# Patient Record
Sex: FEMALE | Race: OTHER | HISPANIC OR LATINO | RURAL
[De-identification: names, ages, dates, MRNs, and addresses within clinical notes are randomized per-mention and may not be internally consistent; named-entity substitution may affect disease eponyms.]

---

## 2024-11-05 DIAGNOSIS — R79.89 ELEVATED LIVER FUNCTION TESTS: Primary | ICD-10-CM

## 2024-12-30 ENCOUNTER — OFFICE VISIT (OUTPATIENT)
Dept: GASTROENTEROLOGY | Facility: CLINIC | Age: 54
End: 2024-12-30
Payer: COMMERCIAL

## 2024-12-30 VITALS
WEIGHT: 208.19 LBS | OXYGEN SATURATION: 100 % | BODY MASS INDEX: 36.89 KG/M2 | HEIGHT: 63 IN | SYSTOLIC BLOOD PRESSURE: 154 MMHG | HEART RATE: 62 BPM | DIASTOLIC BLOOD PRESSURE: 81 MMHG

## 2024-12-30 DIAGNOSIS — R79.89 ELEVATED LIVER FUNCTION TESTS: ICD-10-CM

## 2024-12-30 PROCEDURE — 4010F ACE/ARB THERAPY RXD/TAKEN: CPT | Mod: CPTII,,, | Performed by: NURSE PRACTITIONER

## 2024-12-30 PROCEDURE — 99204 OFFICE O/P NEW MOD 45 MIN: CPT | Mod: PBBFAC | Performed by: NURSE PRACTITIONER

## 2024-12-30 PROCEDURE — 3008F BODY MASS INDEX DOCD: CPT | Mod: CPTII,,, | Performed by: NURSE PRACTITIONER

## 2024-12-30 PROCEDURE — 1159F MED LIST DOCD IN RCRD: CPT | Mod: CPTII,,, | Performed by: NURSE PRACTITIONER

## 2024-12-30 PROCEDURE — 85610 PROTHROMBIN TIME: CPT | Performed by: NURSE PRACTITIONER

## 2024-12-30 PROCEDURE — 99204 OFFICE O/P NEW MOD 45 MIN: CPT | Mod: S$PBB,,, | Performed by: NURSE PRACTITIONER

## 2024-12-30 PROCEDURE — 3079F DIAST BP 80-89 MM HG: CPT | Mod: CPTII,,, | Performed by: NURSE PRACTITIONER

## 2024-12-30 PROCEDURE — 3077F SYST BP >= 140 MM HG: CPT | Mod: CPTII,,, | Performed by: NURSE PRACTITIONER

## 2024-12-30 PROCEDURE — 99999 PR PBB SHADOW E&M-NEW PATIENT-LVL IV: CPT | Mod: PBBFAC,,, | Performed by: NURSE PRACTITIONER

## 2024-12-30 RX ORDER — LEVOTHYROXINE SODIUM 125 UG/1
1 TABLET ORAL DAILY
COMMUNITY
Start: 2024-10-29

## 2024-12-30 RX ORDER — LISINOPRIL 10 MG/1
1 TABLET ORAL DAILY
COMMUNITY
Start: 2024-10-29

## 2024-12-30 NOTE — PROGRESS NOTES
Hilario Sosa is a 54 y.o. female here for Elevated Hepatic Enzymes        PCP: No primary care provider on file.  Referring Provider: Sandra Price, Lorena-oskar  [address]  [facility] [place] [id number]     HPI:  Presents for referral due to elevated liver enzymes.  Patient reports that she was first diagnosed with elevated liver enzymes 3 or 4 years ago.  Review of record shows a liver ultrasound that was performed on 01/17/2020 that did show hepatomegaly and fatty liver.  Labs from 10/29/2024 from an outside facility reviewed, , , alkaline phos 78, bilirubin 0.2.  No anemia.  Patient reports that at the time of elevated liver enzymes that she had started taking turmeric for arthritic pain.  She has since discontinued turmeric.  No other over-the-counter supplements.  Reports that she only drinks alcohol a couple of times per year.  No known family history of liver disease.  Reports occasional diarrhea.  No previous colonoscopy.  States that she did have a negative Cologuard 3 or 4 years ago.  Results are not available for review. Reports difficulty with weight loss.          ROS:  Review of Systems   Constitutional:  Negative for appetite change, fatigue, fever and unexpected weight change.   HENT:  Negative for trouble swallowing. Voice change: occasional.   Gastrointestinal:  Positive for diarrhea. Negative for abdominal pain, blood in stool, change in bowel habit, constipation, nausea, vomiting and reflux.   Musculoskeletal:  Negative for gait problem.   Integumentary:  Negative for pallor.   Psychiatric/Behavioral:  The patient is not nervous/anxious.           PMHX:  has no past medical history on file.    PSHX:  has no past surgical history on file.    PFHX: family history is not on file.    PSlHX:  reports that she has never smoked. She has never been exposed to tobacco smoke. She has never used smokeless tobacco.        Review of patient's allergies  "indicates:  No Known Allergies    Medication List with Changes/Refills   Current Medications    LEVOTHYROXINE (SYNTHROID) 125 MCG TABLET    Take 1 tablet by mouth once daily.    LISINOPRIL 10 MG TABLET    Take 1 tablet by mouth once daily.        Objective Findings:  Vital Signs:  BP (!) 154/81   Pulse 62   Ht 5' 3" (1.6 m)   Wt 94.4 kg (208 lb 3.2 oz)   SpO2 100%   BMI 36.88 kg/m²  Body mass index is 36.88 kg/m².    Physical Exam:  Physical Exam  Vitals and nursing note reviewed.   Constitutional:       General: She is not in acute distress.     Appearance: Normal appearance.   HENT:      Mouth/Throat:      Mouth: Mucous membranes are moist.   Cardiovascular:      Rate and Rhythm: Normal rate.   Pulmonary:      Effort: Pulmonary effort is normal.      Breath sounds: No wheezing, rhonchi or rales.   Abdominal:      General: Bowel sounds are normal. There is no distension.      Palpations: Abdomen is soft. There is no mass.      Tenderness: There is no abdominal tenderness.   Skin:     General: Skin is warm and dry.      Coloration: Skin is not jaundiced or pale.   Neurological:      Mental Status: She is alert and oriented to person, place, and time.   Psychiatric:         Mood and Affect: Mood normal.          Labs:  No results found for: "WBC", "HGB", "HCT", "MCV", "RDW", "PLT", "GRAN", "LYMPH", "MONO", "EOS", "BASO"  No results found for: "NA", "K", "CL", "CO2", "GLU", "BUN", "CREATININE", "CALCIUM", "PROT", "ALBUMIN", "BILITOT", "ALKPHOS", "AST", "ALT"      Imaging: No results found.      Assessment:  Hilario Sosa is a 54 y.o. female here with:  1. Elevated liver function tests          Recommendations:  1. Liver ultrasound and elastography  2. Liver labs today  3. No over the counter herbal supplements  4. Exercise 150 minutes per week  Weight loss of 7-10%. Weight loss should be gradual  Diet low in saturated fats and carbohydrates  Good glucose and cholesterol control    Portions of this note " "may have been created with voice recognition software.  Occasional wrong word or "sound a like substitutions may have occurred due to inherent limitations of voice recognition software.  Please read the note carefully and recognize using contexts, where substitutions have occurred.    Diagnosis, risks, benefits, and side effects of any medications and treatment plan were discussed with the patient.  All questions were answered to the satisfaction of the patient, and patient verbalized understanding and agreement to the treatment plan.          Follow up in about 3 months (around 3/30/2025).      Order summary:  Orders Placed This Encounter    US Abdomen Limited    US Elastography Liver w/imaging    Ceruloplasmin    Hepatitis B Core Antibody, IgM    CL EIA w/ Reflex to dsDNA/JOSE    Alpha-1-Antitrypsin    Hepatitis C Antibody    Hepatitis B Surface Ab, Qualitative    Hepatitis B Surface Antigen    Ferritin    Iron and TIBC    Protein Electrophoresis, Serum with Reflex JANNETTE    CBC Auto Differential    Comprehensive Metabolic Panel    Protime-INR    Antimitochondrial Antibody    Anti-Smooth Muscle Antibody    IgG    IgM    IgA    Endomysial Antibodies, IgG       Thank you for allowing me to participate in the care of Hilario Sosa.      LUCIA ContrerasC          "

## 2024-12-31 ENCOUNTER — TELEPHONE (OUTPATIENT)
Dept: GASTROENTEROLOGY | Facility: CLINIC | Age: 54
End: 2024-12-31
Payer: COMMERCIAL

## 2024-12-31 NOTE — TELEPHONE ENCOUNTER
Results called to patient. Verbalized understanding.          ----- Message from JAYLEN Browning sent at 12/31/2024 12:47 PM CST -----  Hepatitis C negative. Hepatitis B surface AB is negative. Some labs are pending.

## 2025-03-27 ENCOUNTER — TELEPHONE (OUTPATIENT)
Dept: GASTROENTEROLOGY | Facility: CLINIC | Age: 55
End: 2025-03-27

## 2025-03-27 NOTE — TELEPHONE ENCOUNTER
Returned call to patient. Rescheduled US for 4/14/25 at 8:30 am per patient request. Verbalized understanding.          ----- Message from Tech Laturina sent at 3/26/2025 10:36 AM CDT -----  Who Called: Hilario Serrano is requesting a sooner appointment. Caller declined first available appointment listed below. Caller will not accept being placed on the waitlist and is requesting a message be sent to doctor.When is the first available appointment?N/AOptions offered (Virtual Visit, Urgent Care): Symptoms:Preferred Method of Contact: Phone CallPatient's Preferred Phone Number on File: [phone] Best Call Back Number, if different:Additional Information: patient said that she saw ALEAH Farrell on 12/30/24 and she needs to get the US ABD rescheduled, because she has a new insurance now.

## 2025-04-14 ENCOUNTER — TELEPHONE (OUTPATIENT)
Dept: GASTROENTEROLOGY | Facility: CLINIC | Age: 55
End: 2025-04-14
Payer: COMMERCIAL

## 2025-04-14 ENCOUNTER — HOSPITAL ENCOUNTER (OUTPATIENT)
Dept: RADIOLOGY | Facility: HOSPITAL | Age: 55
Discharge: HOME | End: 2025-04-14
Attending: NURSE PRACTITIONER
Payer: COMMERCIAL

## 2025-04-14 ENCOUNTER — RESULTS FOLLOW-UP (OUTPATIENT)
Dept: GASTROENTEROLOGY | Facility: CLINIC | Age: 55
End: 2025-04-14

## 2025-04-14 DIAGNOSIS — R79.89 ELEVATED LIVER FUNCTION TESTS: ICD-10-CM

## 2025-04-14 PROCEDURE — 76705 ECHO EXAM OF ABDOMEN: CPT | Mod: TC

## 2025-04-14 PROCEDURE — 76705 ECHO EXAM OF ABDOMEN: CPT | Mod: 26,,, | Performed by: RADIOLOGY

## 2025-04-14 PROCEDURE — 76981 USE PARENCHYMA: CPT | Mod: TC

## 2025-04-14 PROCEDURE — 76981 USE PARENCHYMA: CPT | Mod: 26,,, | Performed by: RADIOLOGY

## 2025-04-14 NOTE — TELEPHONE ENCOUNTER
Results and recommendations of US and elastography called to patient. Verbalized understanding.              ----- Message from JAYLEN Browning sent at 4/14/2025 12:08 PM CDT -----  No significant clinical risk of fibrosis.  Metavir score F0 to F1  ----- Message -----  From: Interface, Rad Results In  Sent: 4/14/2025  11:45 AM CDT  To: JAYLEN Freedman

## 2025-04-28 ENCOUNTER — TELEPHONE (OUTPATIENT)
Dept: GASTROENTEROLOGY | Facility: CLINIC | Age: 55
End: 2025-04-28
Payer: COMMERCIAL

## 2025-04-30 ENCOUNTER — OFFICE VISIT (OUTPATIENT)
Dept: GASTROENTEROLOGY | Facility: CLINIC | Age: 55
End: 2025-04-30
Payer: COMMERCIAL

## 2025-04-30 VITALS
HEART RATE: 68 BPM | SYSTOLIC BLOOD PRESSURE: 155 MMHG | BODY MASS INDEX: 35.73 KG/M2 | WEIGHT: 201.63 LBS | OXYGEN SATURATION: 100 % | DIASTOLIC BLOOD PRESSURE: 88 MMHG | HEIGHT: 63 IN

## 2025-04-30 DIAGNOSIS — R79.89 ELEVATED LIVER FUNCTION TESTS: Primary | ICD-10-CM

## 2025-04-30 DIAGNOSIS — K76.0 HEPATIC STEATOSIS: ICD-10-CM

## 2025-04-30 PROCEDURE — 3077F SYST BP >= 140 MM HG: CPT | Mod: CPTII,,, | Performed by: NURSE PRACTITIONER

## 2025-04-30 PROCEDURE — 99999 PR PBB SHADOW E&M-EST. PATIENT-LVL III: CPT | Mod: PBBFAC,,, | Performed by: NURSE PRACTITIONER

## 2025-04-30 PROCEDURE — 99214 OFFICE O/P EST MOD 30 MIN: CPT | Mod: S$PBB,,, | Performed by: NURSE PRACTITIONER

## 2025-04-30 PROCEDURE — 1159F MED LIST DOCD IN RCRD: CPT | Mod: CPTII,,, | Performed by: NURSE PRACTITIONER

## 2025-04-30 PROCEDURE — 3079F DIAST BP 80-89 MM HG: CPT | Mod: CPTII,,, | Performed by: NURSE PRACTITIONER

## 2025-04-30 PROCEDURE — 99213 OFFICE O/P EST LOW 20 MIN: CPT | Mod: PBBFAC | Performed by: NURSE PRACTITIONER

## 2025-04-30 PROCEDURE — 3008F BODY MASS INDEX DOCD: CPT | Mod: CPTII,,, | Performed by: NURSE PRACTITIONER

## 2025-04-30 PROCEDURE — 4010F ACE/ARB THERAPY RXD/TAKEN: CPT | Mod: CPTII,,, | Performed by: NURSE PRACTITIONER

## 2025-04-30 NOTE — PATIENT INSTRUCTIONS
Hepatitis B vaccination    Exercise 150 minutes per week  Weight loss of 7-10%. Weight loss should be gradual  Diet low in saturated fats and carbohydrates  Good glucose and cholesterol control  Avoid herbal supplements

## 2025-04-30 NOTE — PROGRESS NOTES
Hilario Sosa is a 54 y.o. female here for Follow-up        PCP: No primary care provider on file.  Referring Provider: Lety Farrell, Lorena  [address] [place] [address]  [place] [address]     HPI:  Up due to elevated liver enzymes.  Autoimmune liver labs were performed.  Hepatitis-C negative, hepatitis-B surface antibody negative, iron normal, ferritin elevated to 94, anti mitochondrial antibody is negative, anti-smooth muscle antibody is also negative.   and AST 82.  Patient does not drink alcohol.  She has discontinued over-the-counter supplements.  Liver ultrasound and elastography were performed.  Liver ultrasound on 12/30 shows hepatic steatosis.  Gallbladder sludge.  Metavir score F0 without any sign of fibrosis.  Fib 4 score is 1.78 which is nondiagnostic.  We did discuss weight loss and exercise 150 minutes per week.  Recommend the Mediterranean diet.  Hepatitis-B vaccination has been recommended.  Patient denies any epigastric pain, nausea, or vomiting.  She had a negative Cologuard within the last 3 years.    Follow-up  Pertinent negatives include no abdominal pain, change in bowel habit, chest pain, fatigue, fever, nausea or vomiting.         ROS:  Review of Systems   Constitutional:  Negative for appetite change, fatigue, fever and unexpected weight change.   HENT:  Negative for trouble swallowing.    Respiratory:  Negative for shortness of breath.    Cardiovascular:  Negative for chest pain.   Gastrointestinal:  Negative for abdominal pain, blood in stool, change in bowel habit, constipation, diarrhea, nausea, vomiting and reflux.   Musculoskeletal:  Negative for gait problem.   Integumentary:  Negative for pallor.   Psychiatric/Behavioral:  The patient is not nervous/anxious.           PMHX:  has no past medical history on file.    PSHX:  has no past surgical history on file.    PFHX: family history is not on file.    PSlHX:  reports that she has never smoked. She  "has never been exposed to tobacco smoke. She has never used smokeless tobacco.        Review of patient's allergies indicates:  No Known Allergies    Medication List with Changes/Refills   Current Medications    LEVOTHYROXINE (SYNTHROID) 125 MCG TABLET    Take 1 tablet by mouth once daily.    LISINOPRIL 10 MG TABLET    Take 1 tablet by mouth once daily.        Objective Findings:  Vital Signs:  BP (!) 155/88   Pulse 68   Ht 5' 3" (1.6 m)   Wt 91.4 kg (201 lb 9.6 oz)   SpO2 100%   BMI 35.71 kg/m²  Body mass index is 35.71 kg/m².    Physical Exam:  Physical Exam  Vitals and nursing note reviewed.   Constitutional:       General: She is not in acute distress.     Appearance: Normal appearance.   HENT:      Mouth/Throat:      Mouth: Mucous membranes are moist.   Cardiovascular:      Rate and Rhythm: Normal rate.   Pulmonary:      Effort: Pulmonary effort is normal.   Abdominal:      General: Bowel sounds are normal. There is no distension.      Palpations: Abdomen is soft. There is no mass.      Tenderness: There is no abdominal tenderness.   Skin:     General: Skin is warm and dry.      Coloration: Skin is not jaundiced or pale.   Neurological:      Mental Status: She is alert and oriented to person, place, and time.   Psychiatric:         Mood and Affect: Mood normal.          Labs:  Lab Results   Component Value Date    WBC 7.65 12/30/2024    HGB 12.2 12/30/2024    HCT 38.8 12/30/2024    MCV 89.2 12/30/2024    RDW 12.3 12/30/2024     12/30/2024    LYMPH 37.3 12/30/2024    LYMPH 2.85 12/30/2024    MONO 9.4 (H) 12/30/2024    EOS 0.26 12/30/2024    BASO 0.06 12/30/2024     Lab Results   Component Value Date     12/30/2024    K 4.4 12/30/2024     12/30/2024    CO2 27 12/30/2024    GLU 97 12/30/2024    BUN 13 12/30/2024    CREATININE 0.75 12/30/2024    CALCIUM 9.0 12/30/2024    PROT 7.1 12/30/2024    PROT 7.1 12/30/2024    ALBUMIN 4.0 12/30/2024    BILITOT 0.3 12/30/2024    ALKPHOS 73 12/30/2024    " AST 82 (H) 12/30/2024     (H) 12/30/2024         Imaging: US Elastography Liver w/imaging  Result Date: 4/14/2025  EXAMINATION: US ELASTOGRAPHY LIVER W/ IMAGING CLINICAL HISTORY: Other specified abnormal findings of blood chemistry TECHNIQUE: Quantitative Ultrasound Assessment of the Liver (QUAL) elastography was performed. COMPARISON: None. FINDINGS: Median elastography: 1.48 m/sec, 6.74 kPa. IQR/median: 25, indicating an acceptable range     Normal to mild fibrosis (F0-F1 METAVIR score), with minimal risk of clinically significant fibrosis. Electronically signed by: Earle Sarmiento MD Date:    04/14/2025 Time:    11:43    US Abdomen Limited  Result Date: 4/14/2025  EXAMINATION: US ABDOMEN LIMITED CLINICAL HISTORY: Other specified abnormal findings of blood chemistry TECHNIQUE: Limited ultrasound of the right upper quadrant of the abdomen (including pancreas, liver, gallbladder, common bile duct, and spleen) was performed. COMPARISON: None. FINDINGS: Pancreas: Visualized portion of the pancreas appear within normal limits. Liver: Enlarged measuring 20.3 cm. Fatty liver infiltration. No focal hepatic lesions. Gallbladder: Layering sludge.  No calculi, wall thickening, or pericholecystic fluid.  No sonographic Covington's sign. Biliary system: The common duct is not dilated, measuring 4 mm.  No intrahepatic ductal dilatation. Spleen: Normal in size and echotexture, measuring 10 x 3.7 cm. Miscellaneous: No upper abdominal ascites.     1. Hepatomegaly with steatosis. 2. Gallbladder sludge. Electronically signed by: Earle Sarmiento MD Date:    04/14/2025 Time:    11:39        Assessment:  Hilario Sosa is a 54 y.o. female here with:  1. Elevated liver function tests    2. Hepatic steatosis          Recommendations:  1. Exercise 150 minutes per week  Weight loss of 7-10%. Weight loss should be gradual  Diet low in saturated fats and carbohydrates  Good glucose and cholesterol control  Avoid alcohol  2.  "Patient will obtain hepatitis-B vaccination from local pharmacy  3. Avoid over-the-counter herbal supplements      Portions of this note may have been created with voice recognition software.  Occasional wrong word or "sound a like substitutions may have occurred due to inherent limitations of voice recognition software.  Please read the note carefully and recognize using contexts, where substitutions have occurred.    Diagnosis, risks, benefits, and side effects of any medications and treatment plan were discussed with the patient.  All questions were answered to the satisfaction of the patient, and patient verbalized understanding and agreement to the treatment plan.      Follow up in about 6 months (around 10/30/2025).      Order summary:       Thank you for allowing me to participate in the care of Bimalwillian Vanmartina.      RADHIKA Contreras          "